# Patient Record
Sex: FEMALE | Race: WHITE | NOT HISPANIC OR LATINO
[De-identification: names, ages, dates, MRNs, and addresses within clinical notes are randomized per-mention and may not be internally consistent; named-entity substitution may affect disease eponyms.]

---

## 2017-04-05 ENCOUNTER — HOSPITAL (OUTPATIENT)
Dept: OTHER | Age: 27
End: 2017-04-05
Attending: EMERGENCY MEDICINE

## 2018-02-20 ENCOUNTER — HOSPITAL ENCOUNTER (OUTPATIENT)
Age: 28
Discharge: HOME | End: 2018-02-20
Attending: EMERGENCY MEDICINE
Payer: COMMERCIAL

## 2018-02-20 VITALS
SYSTOLIC BLOOD PRESSURE: 121 MMHG | HEIGHT: 64 IN | HEART RATE: 102 BPM | OXYGEN SATURATION: 99 % | TEMPERATURE: 99 F | RESPIRATION RATE: 18 BRPM | WEIGHT: 172 LBS | BODY MASS INDEX: 29.37 KG/M2 | DIASTOLIC BLOOD PRESSURE: 79 MMHG

## 2018-02-20 DIAGNOSIS — R10.13 DYSPEPSIA: Primary | ICD-10-CM

## 2018-02-20 PROCEDURE — 99203 OFFICE O/P NEW LOW 30 MIN: CPT

## 2018-02-20 PROCEDURE — 99202 OFFICE O/P NEW SF 15 MIN: CPT

## 2018-02-20 PROCEDURE — 99204 OFFICE O/P NEW MOD 45 MIN: CPT

## 2018-02-20 RX ORDER — PANTOPRAZOLE SODIUM 40 MG/1
40 TABLET, DELAYED RELEASE ORAL DAILY
Qty: 30 TABLET | Refills: 0 | Status: SHIPPED | OUTPATIENT
Start: 2018-02-20 | End: 2018-03-05

## 2018-02-20 NOTE — ED PROVIDER NOTES
Patient Seen in: [facility]    History   Patient presents with:  Abdominal Pain    Stated Complaint: Stomach Pain    HPI  Patient is here with her female partner.   The 6-8 weeks of epigastric discomfort described as a dul Review of Systems    Positive for stated complaint: Stomach Pain  Other systems are as noted in HPI. Constitutional and vital signs reviewed. All other systems reviewed and negative except as noted above.     Physical Exam   ED Triage Vitals [02 care physician that they see and she may follow-up with that doctor. Patient also was given the name of a  in this building. I will write a prescription for Protonix and see if acid reduction will help her symptoms.   She was advised that follow-up thi

## 2018-02-20 NOTE — ED INITIAL ASSESSMENT (HPI)
PATIENT ARRIVED AMBULATORY TO ROOM C/O INTERMITTENT ABDOMINAL PAIN X1 MONTH. PATIENT STATES \"I JUST WANTED IT CHECKED OUT\" +MID EPIGASTRIC ABDOMINAL PAIN RADIATING TO THE LEFT FLANK. +INTERMITTENT NAUSEA. NO V/D. NO VAGINAL BLEEDING/DISCHARGE.  NO DYSURIA

## 2018-03-05 ENCOUNTER — OFFICE VISIT (OUTPATIENT)
Dept: FAMILY MEDICINE CLINIC | Facility: CLINIC | Age: 28
End: 2018-03-05

## 2018-03-05 VITALS
HEART RATE: 93 BPM | SYSTOLIC BLOOD PRESSURE: 121 MMHG | BODY MASS INDEX: 30 KG/M2 | TEMPERATURE: 98 F | WEIGHT: 176 LBS | DIASTOLIC BLOOD PRESSURE: 78 MMHG

## 2018-03-05 DIAGNOSIS — K29.00 OTHER ACUTE GASTRITIS WITHOUT HEMORRHAGE: Primary | ICD-10-CM

## 2018-03-05 PROCEDURE — 99201 OFFICE/OUTPT VISIT,NEW,LEVL I: CPT | Performed by: FAMILY MEDICINE

## 2018-03-05 PROCEDURE — 99212 OFFICE O/P EST SF 10 MIN: CPT | Performed by: FAMILY MEDICINE

## 2018-03-05 RX ORDER — PANTOPRAZOLE SODIUM 40 MG/1
40 TABLET, DELAYED RELEASE ORAL
Qty: 90 TABLET | Refills: 3 | Status: SHIPPED | OUTPATIENT
Start: 2018-03-05 | End: 2018-12-23

## 2018-03-06 NOTE — PROGRESS NOTES
2 mo of burping and diarrhea stomach upset  Was taking Protonix form urgent care. Now much better minor stomach cramping. nausea  Drinks coffee cut down to 1  Was a heavy coffee drinker. Mom had gerd and acid reflux  Pos smoker.         Patient's past

## 2018-03-12 ENCOUNTER — LAB ENCOUNTER (OUTPATIENT)
Dept: LAB | Age: 28
End: 2018-03-12
Attending: FAMILY MEDICINE
Payer: COMMERCIAL

## 2018-03-12 DIAGNOSIS — K29.00 OTHER ACUTE GASTRITIS WITHOUT HEMORRHAGE: Primary | ICD-10-CM

## 2018-03-12 PROCEDURE — 83013 H PYLORI (C-13) BREATH: CPT

## 2018-03-13 LAB — H. PYLORI BREATH TEST: NEGATIVE

## 2018-03-21 ENCOUNTER — OFFICE VISIT (OUTPATIENT)
Dept: FAMILY MEDICINE CLINIC | Facility: CLINIC | Age: 28
End: 2018-03-21

## 2018-03-21 VITALS
BODY MASS INDEX: 29.19 KG/M2 | DIASTOLIC BLOOD PRESSURE: 86 MMHG | SYSTOLIC BLOOD PRESSURE: 125 MMHG | HEART RATE: 101 BPM | WEIGHT: 171 LBS | HEIGHT: 64 IN

## 2018-03-21 DIAGNOSIS — K21.9 GASTROESOPHAGEAL REFLUX DISEASE, ESOPHAGITIS PRESENCE NOT SPECIFIED: ICD-10-CM

## 2018-03-21 DIAGNOSIS — Z72.0 TOBACCO USE: ICD-10-CM

## 2018-03-21 DIAGNOSIS — K02.9 DENTAL CARIES: ICD-10-CM

## 2018-03-21 DIAGNOSIS — M54.50 ACUTE BILATERAL LOW BACK PAIN WITHOUT SCIATICA: ICD-10-CM

## 2018-03-21 DIAGNOSIS — Z00.00 WELL ADULT HEALTH CHECK: Primary | ICD-10-CM

## 2018-03-21 PROCEDURE — 99213 OFFICE O/P EST LOW 20 MIN: CPT | Performed by: NURSE PRACTITIONER

## 2018-03-21 PROCEDURE — 99395 PREV VISIT EST AGE 18-39: CPT | Performed by: NURSE PRACTITIONER

## 2018-03-21 RX ORDER — AMOXICILLIN 875 MG/1
875 TABLET, COATED ORAL 2 TIMES DAILY
Qty: 14 TABLET | Refills: 0 | Status: SHIPPED | OUTPATIENT
Start: 2018-03-21 | End: 2018-03-28

## 2018-03-21 NOTE — PROGRESS NOTES
HPI  Pt here for annual physical.  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 Has a left cracked wisdom tooth-has pain in left throat and jaw area  Smokes about a 1/2 pack per day.    Is sexually act Other Topics Concern   None on file     Social History Narrative   None on file         Current Outpatient Prescriptions:  amoxicillin 875 MG Oral Tab Take 1 tablet (875 mg total) by mouth 2 (two) times daily.  Disp: 14 tablet Rfl: 0   Pantoprazole Sodium ( Psychiatric: She has a normal mood and affect. Her behavior is normal. Judgment and thought content normal.   Nursing note and vitals reviewed. Assessment:     1. Well adult health check    2. Acute bilateral low back pain without sciatica    3.  Denta

## 2018-03-21 NOTE — PATIENT INSTRUCTIONS
LOW BACK PAIN    DESCRIPTION:     Eighty percent of adults will experience significant low back pain sometime during their lifetime. Low back pain usually involves muscle spasm of the supportive muscles along the spine.  Also, pain, numbness and  tingling TREATMENT:    REST: Rest from aggravating activity. Avoid prolonged sitting, driving, bending, heavy lifting and twisting. ICE: Ice applied to the low back for 20 minutes every 1 – 2 hours is helpful in reducing pain and spasm.  DO NOT use heat unless othe -please drink at least one large glass of water prior to coming in for labs to be drawn    [facility]  Address: [address] [facility], [address] [facility],  [address]  Hours:   Monday - Friday 7:30am - 6:30pm  Saturday 7:30am - 4pm    Phone: [phone]

## 2018-03-26 DIAGNOSIS — E53.8 VITAMIN B12 DEFICIENCY: ICD-10-CM

## 2018-03-26 DIAGNOSIS — E55.9 VITAMIN D DEFICIENCY: Primary | ICD-10-CM

## 2018-03-26 LAB
ABSOLUTE BASOPHILS: 91 CELLS/UL (ref 0–200)
ABSOLUTE EOSINOPHILS: 222 CELLS/UL (ref 15–500)
ABSOLUTE LYMPHOCYTES: 2242 CELLS/UL (ref 850–3900)
ABSOLUTE MONOCYTES: 535 CELLS/UL (ref 200–950)
ABSOLUTE NEUTROPHILS: 7009 CELLS/UL (ref 1500–7800)
ALBUMIN/GLOBULIN RATIO: 1.9 (CALC) (ref 1–2.5)
ALBUMIN: 4.9 G/DL (ref 3.6–5.1)
ALKALINE PHOSPHATASE: 41 U/L (ref 33–115)
ALT: 7 U/L (ref 6–29)
AST: 10 U/L (ref 10–30)
BASOPHILS: 0.9 %
BILIRUBIN, TOTAL: 0.6 MG/DL (ref 0.2–1.2)
BILIRUBIN: NEGATIVE
BUN: 9 MG/DL (ref 7–25)
CALCIUM: 9.6 MG/DL (ref 8.6–10.2)
CARBON DIOXIDE: 24 MMOL/L (ref 20–31)
CHLORIDE: 105 MMOL/L (ref 98–110)
CHOL/HDLC RATIO: 3.5 (CALC)
CHOLESTEROL, TOTAL: 150 MG/DL
COLOR: YELLOW
CREATININE: 0.84 MG/DL (ref 0.5–1.1)
EGFR IF AFRICN AM: 110 ML/MIN/1.73M2
EGFR IF NONAFRICN AM: 95 ML/MIN/1.73M2
EOSINOPHILS: 2.2 %
GLOBULIN: 2.6 G/DL (CALC) (ref 1.9–3.7)
GLUCOSE: 85 MG/DL (ref 65–99)
GLUCOSE: NEGATIVE
HDL CHOLESTEROL: 43 MG/DL
HEMATOCRIT: 42.1 % (ref 35–45)
HEMOGLOBIN A1C: 4.9 % OF TOTAL HGB
HEMOGLOBIN: 13.7 G/DL (ref 11.7–15.5)
LDL-CHOLESTEROL: 92 MG/DL (CALC)
LYMPHOCYTES: 22.2 %
MCH: 27.5 PG (ref 27–33)
MCHC: 32.5 G/DL (ref 32–36)
MCV: 84.5 FL (ref 80–100)
MONOCYTES: 5.3 %
MPV: 11.4 FL (ref 7.5–12.5)
NEUTROPHILS: 69.4 %
NITRITE: NEGATIVE
NON-HDL CHOLESTEROL: 107 MG/DL (CALC)
OCCULT BLOOD: NEGATIVE
PH: 6.5 (ref 5–8)
PLATELET COUNT: 320 THOUSAND/UL (ref 140–400)
POTASSIUM: 4.1 MMOL/L (ref 3.5–5.3)
PROTEIN, TOTAL: 7.5 G/DL (ref 6.1–8.1)
PROTEIN: NEGATIVE
RDW: 12.2 % (ref 11–15)
RED BLOOD CELL COUNT: 4.98 MILLION/UL (ref 3.8–5.1)
SODIUM: 138 MMOL/L (ref 135–146)
SPECIFIC GRAVITY: 1.02 (ref 1–1.03)
TRIGLYCERIDES: 66 MG/DL
TSH W/REFLEX TO FT4: 1.07 MIU/L
VITAMIN B12: 253 PG/ML (ref 200–1100)
VITAMIN D, 25-OH, TOTAL: 11 NG/ML (ref 30–100)
WHITE BLOOD CELL COUNT: 10.1 THOUSAND/UL (ref 3.8–10.8)

## 2018-03-26 RX ORDER — ERGOCALCIFEROL 1.25 MG/1
50000 CAPSULE ORAL
Qty: 12 CAPSULE | Refills: 4 | Status: SHIPPED | OUTPATIENT
Start: 2018-03-26 | End: 2018-06-24

## 2018-03-26 RX ORDER — NITROFURANTOIN 25; 75 MG/1; MG/1
100 CAPSULE ORAL 2 TIMES DAILY
Qty: 14 CAPSULE | Refills: 0 | Status: SHIPPED | OUTPATIENT
Start: 2018-03-26 | End: 2019-05-13 | Stop reason: ALTCHOICE

## 2018-12-24 RX ORDER — PANTOPRAZOLE SODIUM 40 MG/1
40 TABLET, DELAYED RELEASE ORAL
Qty: 90 TABLET | Refills: 0 | Status: SHIPPED | OUTPATIENT
Start: 2018-12-24 | End: 2019-03-24

## 2018-12-24 RX ORDER — PANTOPRAZOLE SODIUM 40 MG/1
40 TABLET, DELAYED RELEASE ORAL
Qty: 90 TABLET | Refills: 3 | OUTPATIENT
Start: 2018-12-24

## 2018-12-24 NOTE — TELEPHONE ENCOUNTER
Refill Protocol Appointment Criteria  · Appointment scheduled in the past 12 months or in the next 3 months  Recent Outpatient Visits            9 months ago Well adult health check    [address], [facility], Jersongeneva Brice, CHAPO, FNP-C

## 2019-03-25 RX ORDER — PANTOPRAZOLE SODIUM 40 MG/1
40 TABLET, DELAYED RELEASE ORAL
Qty: 90 TABLET | Refills: 0 | Status: CANCELLED | OUTPATIENT
Start: 2019-03-25

## 2019-03-27 RX ORDER — PANTOPRAZOLE SODIUM 40 MG/1
40 TABLET, DELAYED RELEASE ORAL
Qty: 30 TABLET | Refills: 0 | Status: SHIPPED | OUTPATIENT
Start: 2019-03-27 | End: 2019-03-27

## 2019-03-27 RX ORDER — PANTOPRAZOLE SODIUM 40 MG/1
TABLET, DELAYED RELEASE ORAL
Qty: 90 TABLET | Refills: 0 | Status: SHIPPED | OUTPATIENT
Start: 2019-03-27 | End: 2019-05-13

## 2019-05-13 ENCOUNTER — OFFICE VISIT (OUTPATIENT)
Dept: FAMILY MEDICINE CLINIC | Facility: CLINIC | Age: 29
End: 2019-05-13
Payer: COMMERCIAL

## 2019-05-13 VITALS
WEIGHT: 153 LBS | HEART RATE: 70 BPM | TEMPERATURE: 98 F | DIASTOLIC BLOOD PRESSURE: 68 MMHG | BODY MASS INDEX: 26 KG/M2 | SYSTOLIC BLOOD PRESSURE: 107 MMHG

## 2019-05-13 DIAGNOSIS — Z12.4 ROUTINE PAPANICOLAOU SMEAR: Primary | ICD-10-CM

## 2019-05-13 DIAGNOSIS — E55.9 VITAMIN D DEFICIENCY: ICD-10-CM

## 2019-05-13 DIAGNOSIS — Z72.0 TOBACCO ABUSE: ICD-10-CM

## 2019-05-13 DIAGNOSIS — Z00.00 ANNUAL PHYSICAL EXAM: ICD-10-CM

## 2019-05-13 DIAGNOSIS — R10.13 DYSPEPSIA: ICD-10-CM

## 2019-05-13 PROCEDURE — 90715 TDAP VACCINE 7 YRS/> IM: CPT | Performed by: FAMILY MEDICINE

## 2019-05-13 PROCEDURE — 90471 IMMUNIZATION ADMIN: CPT | Performed by: FAMILY MEDICINE

## 2019-05-13 PROCEDURE — 99395 PREV VISIT EST AGE 18-39: CPT | Performed by: FAMILY MEDICINE

## 2019-05-13 RX ORDER — PANTOPRAZOLE SODIUM 40 MG/1
40 TABLET, DELAYED RELEASE ORAL
Qty: 90 TABLET | Refills: 3 | Status: CANCELLED | OUTPATIENT
Start: 2019-05-13 | End: 2020-05-12

## 2019-05-13 RX ORDER — PANTOPRAZOLE SODIUM 20 MG/1
20 TABLET, DELAYED RELEASE ORAL
Qty: 90 TABLET | Refills: 0 | Status: SHIPPED | OUTPATIENT
Start: 2019-05-13 | End: 2019-06-13

## 2019-05-14 NOTE — PROGRESS NOTES
REASON FOR VISIT:    Enedelia Torres is a 26year old female who presents for an [address]. Doing well with ppi  No sob no brakethrough pain    Never had pap.     Patient Active Problem List:     Vitamin D deficiency     Vitamin B12 defic No disease specific diagnoses    ALLERGIES:   No Known Allergies  CURRENT MEDICATIONS:     Current Outpatient Medications:  Pantoprazole Sodium 20 MG Oral Tab EC Take 1 tablet (20 mg total) by mouth every morning before breakfast. Disp: 90 tablet Rfl: 0 are clear.  normal optic disc  NECK: supple, no adenopathy, no bruits  CHEST: no chest tenderness  BREAST: deferred   LUNGS: clear to auscultation  CARDIO: RRR without murmur  GI: good BS's, no masses, HSM or tenderness  :deferred  RECTAL: deferred  MUSCU

## 2019-06-13 ENCOUNTER — PATIENT MESSAGE (OUTPATIENT)
Dept: FAMILY MEDICINE CLINIC | Facility: CLINIC | Age: 29
End: 2019-06-13

## 2019-06-13 RX ORDER — PANTOPRAZOLE SODIUM 40 MG/1
40 TABLET, DELAYED RELEASE ORAL
Qty: 90 TABLET | Refills: 3 | Status: SHIPPED | OUTPATIENT
Start: 2019-06-13 | End: 2019-12-13

## 2019-06-13 NOTE — TELEPHONE ENCOUNTER
From: Kareem Espinoza  To: Aaliyah Jean DO  Sent: 6/13/2019 9:11 AM CDT  Subject: Prescription Question    Hello. During my last visit, you put me on a lower dose of pantoprazole. I don't feel like it's working as well as the dose I was on before.

## 2019-12-14 RX ORDER — PANTOPRAZOLE SODIUM 40 MG/1
40 TABLET, DELAYED RELEASE ORAL
Qty: 90 TABLET | Refills: 1 | Status: SHIPPED | OUTPATIENT
Start: 2019-12-14 | End: 2020-07-20

## 2019-12-14 NOTE — TELEPHONE ENCOUNTER
Refill passed per [facility] protocol.     Refill Protocol Appointment Criteria  · Appointment scheduled in the past 12 months or in the next 3 months  Recent Outpatient Visits            7 months ago Routine Papanicolaou smear    [facility]

## 2020-05-28 RX ORDER — PANTOPRAZOLE SODIUM 40 MG/1
40 TABLET, DELAYED RELEASE ORAL
Qty: 30 TABLET | Refills: 0 | Status: SHIPPED | OUTPATIENT
Start: 2020-05-28 | End: 2020-07-20

## 2020-05-28 NOTE — TELEPHONE ENCOUNTER
Jasbir Murray=medication pended,see patient's message . CSS=please call and schedule for establish care. MyChart message sent.

## 2020-05-30 RX ORDER — PANTOPRAZOLE SODIUM 40 MG/1
40 TABLET, DELAYED RELEASE ORAL
Qty: 30 TABLET | Refills: 0 | OUTPATIENT
Start: 2020-05-30

## 2020-06-02 RX ORDER — PANTOPRAZOLE SODIUM 40 MG/1
40 TABLET, DELAYED RELEASE ORAL
Qty: 30 TABLET | Refills: 0 | OUTPATIENT
Start: 2020-06-02

## 2020-07-20 ENCOUNTER — PATIENT MESSAGE (OUTPATIENT)
Dept: FAMILY MEDICINE CLINIC | Facility: CLINIC | Age: 30
End: 2020-07-20

## 2020-07-20 RX ORDER — PANTOPRAZOLE SODIUM 40 MG/1
40 TABLET, DELAYED RELEASE ORAL
Qty: 30 TABLET | Refills: 0 | Status: SHIPPED | OUTPATIENT
Start: 2020-07-20 | End: 2020-08-15

## 2020-07-20 NOTE — TELEPHONE ENCOUNTER
Please see patient's email and advise.     Requested Prescriptions     Pending Prescriptions Disp Refills   • Pantoprazole Sodium 40 MG Oral Tab EC 30 tablet 0     Sig: Take 1 tablet (40 mg total) by mouth every morning before breakfast.         Recent Visi

## 2020-07-20 NOTE — TELEPHONE ENCOUNTER
From: Ayush Conti  To: Dempsey Mohs, APRN  Sent: 7/20/2020 5:44 AM CDT  Subject: Prescription Question    Hello,    I was able to finally get in on August 15th for an appointment but she just ran out of my prescription this morning.  Is there a wa

## 2020-07-21 RX ORDER — PANTOPRAZOLE SODIUM 40 MG/1
40 TABLET, DELAYED RELEASE ORAL
Qty: 30 TABLET | Refills: 0 | OUTPATIENT
Start: 2020-07-21

## 2020-08-15 ENCOUNTER — OFFICE VISIT (OUTPATIENT)
Dept: FAMILY MEDICINE CLINIC | Facility: CLINIC | Age: 30
End: 2020-08-15
Payer: COMMERCIAL

## 2020-08-15 VITALS
DIASTOLIC BLOOD PRESSURE: 80 MMHG | HEIGHT: 64 IN | BODY MASS INDEX: 29.71 KG/M2 | HEART RATE: 84 BPM | SYSTOLIC BLOOD PRESSURE: 125 MMHG | WEIGHT: 174 LBS

## 2020-08-15 DIAGNOSIS — E53.8 VITAMIN B12 DEFICIENCY: ICD-10-CM

## 2020-08-15 DIAGNOSIS — Z00.00 WELL ADULT EXAM: Primary | ICD-10-CM

## 2020-08-15 DIAGNOSIS — K21.9 GASTROESOPHAGEAL REFLUX DISEASE WITHOUT ESOPHAGITIS: ICD-10-CM

## 2020-08-15 DIAGNOSIS — E55.9 VITAMIN D DEFICIENCY: ICD-10-CM

## 2020-08-15 PROCEDURE — 99395 PREV VISIT EST AGE 18-39: CPT | Performed by: NURSE PRACTITIONER

## 2020-08-15 PROCEDURE — 3074F SYST BP LT 130 MM HG: CPT | Performed by: NURSE PRACTITIONER

## 2020-08-15 PROCEDURE — 3079F DIAST BP 80-89 MM HG: CPT | Performed by: NURSE PRACTITIONER

## 2020-08-15 PROCEDURE — 3008F BODY MASS INDEX DOCD: CPT | Performed by: NURSE PRACTITIONER

## 2020-08-15 RX ORDER — PANTOPRAZOLE SODIUM 40 MG/1
40 TABLET, DELAYED RELEASE ORAL
Qty: 90 TABLET | Refills: 3 | Status: SHIPPED | OUTPATIENT
Start: 2020-08-15 | End: 2021-05-05

## 2020-08-15 RX ORDER — PANTOPRAZOLE SODIUM 40 MG/1
40 TABLET, DELAYED RELEASE ORAL
Qty: 90 TABLET | Refills: 3 | Status: SHIPPED | OUTPATIENT
Start: 2020-08-15 | End: 2020-08-15

## 2020-08-15 NOTE — PATIENT INSTRUCTIONS
Prevention Guidelines, Women Ages 25 to 44  Screening tests and vaccines are an important part of managing your health. A screening test is done to find possible disorders or diseases in people who don't have any symptoms.  The goal is to find a disease e Anyone at increased risk  At routine exams   HIV All women At routine exams3     Obesity All women in this age group  At routine exams   Syphilis Women at increased risk for infection should talk with their healthcare provider  At routine exams   Tuberculo (protects against 13 types of pneumococcal bacteria)   PPSV23: 1 to 2 doses through age 59, or 1 dose at 72 or older (protects against 23 types of pneumococcal bacteria)    Tetanus/diphtheria/pertussis (Td/Tdap) booster All women in this age group  Td ever All rights reserved. This information is not intended as a substitute for professional medical care. Always follow your healthcare professional's instructions.         Lifestyle Changes for Controlling GERD  When you have GERD, stomach acid feels as if it’s [address]. [address] [facility], [address] [place] [address]. All rights reserved. This information is not intended as a substitute for professional medical care. Always follow your healthcare professional's instructions.

## 2020-08-15 NOTE — PROGRESS NOTES
HPI  Pt here for annual physical.   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 Quit smoking one year ago. Has been going to gym every day. Worked out at home when shut down was in progress.     Periods-regular  Last pap-5 months ago-wnl will f/u Maternal Grandfather    • Heart Attack Maternal Grandfather    • Dementia Paternal Grandmother    • Dementia Paternal Grandfather        Social History    Socioeconomic History      Marital status: Single      Spouse name: Not on file      Number of childr Constitutional: She is oriented to person, place, and time. She appears well-developed and well-nourished. No distress. HENT:   Head: Normocephalic and atraumatic.    Right Ear: Tympanic membrane and ear canal normal. No cerumen present  Left Ear: Tympa fall  Congratulated on quitting smoking.           Relevant Orders    CBC, PLATELET; NO DIFFERENTIAL    COMP METABOLIC PANEL (14)    HEMOGLOBIN A1C    LIPID PANEL    TSH W REFLEX TO FREE T4    VITAMIN B12    VITAMIN D, 25-HYDROXY                      Discus

## 2020-10-13 ENCOUNTER — NURSE TRIAGE (OUTPATIENT)
Dept: FAMILY MEDICINE CLINIC | Facility: CLINIC | Age: 30
End: 2020-10-13

## 2020-10-13 ENCOUNTER — PATIENT MESSAGE (OUTPATIENT)
Dept: FAMILY MEDICINE CLINIC | Facility: CLINIC | Age: 30
End: 2020-10-13

## 2020-10-13 NOTE — TELEPHONE ENCOUNTER
Rupali sent.   RN=call and triage    ----- Message from Ciro Kramer sent at 10/13/2020  5:57 AM CDT -----  Regarding: Prescription Question  Contact: [phone]  Good morning,   I'm almost positive I have a UTI, it's been hurting the last two days w

## 2020-10-13 NOTE — TELEPHONE ENCOUNTER
Patient has read [employer] message  [address]    [employer] User Last Read On   Kinza Redramona 10/13/2020  2:12 PM

## 2020-10-14 NOTE — TELEPHONE ENCOUNTER
Pt should seek medical care from Immediate Care near her if unable to come in. We really need to check urine to see if infection is present and not just prescribe antibiotics. Is she able to go to a [facility] facility near her to drop off a urine speciman?  I

## 2020-10-14 NOTE — TELEPHONE ENCOUNTER
Action Requested: Summary for Provider     []  Critical Lab, Recommendations Needed  [] Need Additional Advice  []   FYI    []   Need Orders  [x] Need Medications Sent to Pharmacy  []  Other     SUMMARY: Per protocol advised :   OV  Declines OV     Reason

## 2020-10-14 NOTE — TELEPHONE ENCOUNTER
The patient was informed and will proceed to a [facility]. I suggested she call her insurance company to see which locations are covered.   She agreed

## 2021-05-05 DIAGNOSIS — K21.9 GASTROESOPHAGEAL REFLUX DISEASE WITHOUT ESOPHAGITIS: ICD-10-CM

## 2021-05-05 RX ORDER — PANTOPRAZOLE SODIUM 40 MG/1
40 TABLET, DELAYED RELEASE ORAL
Qty: 90 TABLET | Refills: 0 | Status: SHIPPED | OUTPATIENT
Start: 2021-05-05 | End: 2021-08-18

## 2021-07-19 ENCOUNTER — TELEPHONE (OUTPATIENT)
Dept: FAMILY MEDICINE CLINIC | Facility: CLINIC | Age: 31
End: 2021-07-19

## 2021-07-19 NOTE — TELEPHONE ENCOUNTER
Please advise.    ----- Message -----   From: Ayush Conti   Sent: 7/19/2021  10:32 AM CDT   To: Jaylan Ramirez Customer Response Pool   Subject: Appointment on August 7th                          Topic: Bill or Statement      Hello    I have a physical sched

## 2021-08-18 DIAGNOSIS — K21.9 GASTROESOPHAGEAL REFLUX DISEASE WITHOUT ESOPHAGITIS: ICD-10-CM

## 2021-08-18 RX ORDER — PANTOPRAZOLE SODIUM 40 MG/1
40 TABLET, DELAYED RELEASE ORAL
Qty: 90 TABLET | Refills: 0 | Status: SHIPPED | OUTPATIENT
Start: 2021-08-18 | End: 2022-01-05

## 2021-08-31 ENCOUNTER — MED REC SCAN ONLY (OUTPATIENT)
Dept: FAMILY MEDICINE CLINIC | Facility: CLINIC | Age: 31
End: 2021-08-31

## 2021-09-04 ENCOUNTER — OFFICE VISIT (OUTPATIENT)
Dept: FAMILY MEDICINE CLINIC | Facility: CLINIC | Age: 31
End: 2021-09-04
Payer: COMMERCIAL

## 2021-09-04 VITALS
BODY MASS INDEX: 29.53 KG/M2 | WEIGHT: 173 LBS | SYSTOLIC BLOOD PRESSURE: 127 MMHG | DIASTOLIC BLOOD PRESSURE: 85 MMHG | HEIGHT: 64 IN | HEART RATE: 86 BPM

## 2021-09-04 DIAGNOSIS — Z00.00 WELL ADULT EXAM: Primary | ICD-10-CM

## 2021-09-04 DIAGNOSIS — E53.8 VITAMIN B12 DEFICIENCY: ICD-10-CM

## 2021-09-04 DIAGNOSIS — E55.9 VITAMIN D DEFICIENCY: ICD-10-CM

## 2021-09-04 PROCEDURE — 3008F BODY MASS INDEX DOCD: CPT | Performed by: NURSE PRACTITIONER

## 2021-09-04 PROCEDURE — 99395 PREV VISIT EST AGE 18-39: CPT | Performed by: NURSE PRACTITIONER

## 2021-09-04 PROCEDURE — 3079F DIAST BP 80-89 MM HG: CPT | Performed by: NURSE PRACTITIONER

## 2021-09-04 PROCEDURE — 3074F SYST BP LT 130 MM HG: CPT | Performed by: NURSE PRACTITIONER

## 2021-09-04 NOTE — PROGRESS NOTES
HPI  Pt here for annual physical.     No acute or chronic health concerns. Quit smoking 2 years ago. Decided to change positions at work for a less stressful job and is so happy that she did. States that she has never felt so healthy as she does now. Single      Spouse name: Not on file      Number of children: Not on file      Years of education: Not on file      Highest education level: Not on file    Occupational History      Not on file    Tobacco Use      Smoking status: Former Smoker        Types Left Ear: Tympanic membrane and ear canal normal.      Nose: Nose normal.      Mouth/Throat:      Mouth: Mucous membranes are moist.      Pharynx: Oropharynx is clear. Eyes:      General:         Right eye: No discharge. Left eye: No discharge. DIFFERENTIAL    COMP METABOLIC PANEL (14)    HEMOGLOBIN A1C    LIPID PANEL    TSH W REFLEX TO FREE T4    VITAMIN B12    VITAMIN D, 25-HYDROXY                      Discussed plan of care with pt and pt is in agreement. All questions answered.  Pt to call with

## 2021-12-15 ENCOUNTER — TELEPHONE (OUTPATIENT)
Dept: FAMILY MEDICINE CLINIC | Facility: CLINIC | Age: 31
End: 2021-12-15

## 2021-12-15 ENCOUNTER — PATIENT MESSAGE (OUTPATIENT)
Dept: FAMILY MEDICINE CLINIC | Facility: CLINIC | Age: 31
End: 2021-12-15

## 2021-12-15 NOTE — TELEPHONE ENCOUNTER
From: Benjy Nixon  To: VINNIE Peñaloza  Sent: 12/15/2021 12:33 PM CST  Subject: Covid     Hi! I tested positive for covid exactly two weeks ago today. I am vaccinated but not with a booster.  I’m now covid negative after several test and I am f

## 2021-12-15 NOTE — TELEPHONE ENCOUNTER
----- Message from Emily Brown sent at 12/15/2021 12:33 PM CST -----  Regarding: Covid   Hi! I tested positive for covid exactly two weeks ago today. I am vaccinated but not with a booster.  I’m now covid negative after several test and I am feeling mu

## 2022-01-05 DIAGNOSIS — K21.9 GASTROESOPHAGEAL REFLUX DISEASE WITHOUT ESOPHAGITIS: ICD-10-CM

## 2022-01-05 RX ORDER — PANTOPRAZOLE SODIUM 40 MG/1
40 TABLET, DELAYED RELEASE ORAL
Qty: 90 TABLET | Refills: 0 | Status: SHIPPED | OUTPATIENT
Start: 2022-01-05

## 2022-01-05 NOTE — TELEPHONE ENCOUNTER
Rx request for Pantoprazole 40 mg PASSED protocol. Rx filled per protocol.     Refill Protocol Appointment Criteria  · Appointment scheduled in the past 12 months or in the next 3 months  Recent Outpatient Visits            4 months ago Well adult exam    E

## 2022-01-12 ENCOUNTER — MED REC SCAN ONLY (OUTPATIENT)
Dept: FAMILY MEDICINE CLINIC | Facility: CLINIC | Age: 32
End: 2022-01-12

## 2022-04-12 DIAGNOSIS — K21.9 GASTROESOPHAGEAL REFLUX DISEASE WITHOUT ESOPHAGITIS: ICD-10-CM

## 2022-04-13 RX ORDER — PANTOPRAZOLE SODIUM 40 MG/1
40 TABLET, DELAYED RELEASE ORAL
Qty: 90 TABLET | Refills: 1 | Status: SHIPPED | OUTPATIENT
Start: 2022-04-13 | End: 2023-02-18

## 2022-04-13 NOTE — TELEPHONE ENCOUNTER
Refill passed per [facility] protocol.      Requested Prescriptions   Pending Prescriptions Disp Refills    pantoprazole 40 MG Oral Tab EC 90 tablet 0     Sig: Take 1 tablet (40 mg total) by mouth every morning before breakfast.        Gastrointestional Medication Protocol Passed - 4/12/2022 10:19 PM        Passed - Appointment in past 12 or next 3 months                Recent Outpatient Visits              7 months ago Well adult exam    [facility] [address], [place] Simental, APRN    Office Visit    1 year ago Well adult exam    [facility] [address], [address], Nestor Patch, APRN    Office Visit    2 years ago Routine Papanicolaou smear    [facility], DO    Office Visit    4 years ago Well adult health check    [facility] [address], [address], Nestor Patch, APRN    Office Visit    4 years ago Other acute gastritis without hemorrhage    [address], DO    Office Visit

## 2022-05-25 ENCOUNTER — MED REC SCAN ONLY (OUTPATIENT)
Dept: FAMILY MEDICINE CLINIC | Facility: CLINIC | Age: 32
End: 2022-05-25

## 2023-01-04 ENCOUNTER — TELEMEDICINE (OUTPATIENT)
Dept: TELEHEALTH | Age: 33
End: 2023-01-04

## 2023-01-04 DIAGNOSIS — R10.10 PAIN OF UPPER ABDOMEN: Primary | ICD-10-CM

## 2023-01-04 NOTE — PATIENT INSTRUCTIONS
Fluids   Trial of zantac OTC to see if improvement   [place] diet- avoid GERD triggering foods   Follow up with GI as scheduled.    ED for worsening pain, fever, vomiting, inability to pass stool

## 2023-01-19 ENCOUNTER — PATIENT MESSAGE (OUTPATIENT)
Dept: FAMILY MEDICINE CLINIC | Facility: CLINIC | Age: 33
End: 2023-01-19

## 2023-01-19 DIAGNOSIS — Z00.00 WELL ADULT EXAM: Primary | ICD-10-CM

## 2023-01-19 NOTE — TELEPHONE ENCOUNTER
Nori Lamas RN 1/19/2023 11:35 AM CST        ----- Message -----  From: Bayron Linder  Sent: 1/19/2023 11:01 AM CST  To: Em Rn Triage  Subject: Blood Work     Hi there! I have a physical scheduled with you on 2/18. Is it possible to get me an order for blood work a week prior to that appointment?     Thank you

## 2023-01-21 ENCOUNTER — PATIENT MESSAGE (OUTPATIENT)
Dept: FAMILY MEDICINE CLINIC | Facility: CLINIC | Age: 33
End: 2023-01-21

## 2023-01-22 ENCOUNTER — PATIENT MESSAGE (OUTPATIENT)
Dept: FAMILY MEDICINE CLINIC | Facility: CLINIC | Age: 33
End: 2023-01-22

## 2023-01-22 NOTE — TELEPHONE ENCOUNTER
From: Damien Section  To: VINNIE Bray  Sent: 1/19/2023 11:01 AM CST  Subject: Blood Work    Hi there! I have a physical scheduled with you on 2/18. Is it possible to get me an order for blood work a week prior to that appointment?     Thank you

## 2023-02-01 ENCOUNTER — PATIENT MESSAGE (OUTPATIENT)
Dept: FAMILY MEDICINE CLINIC | Facility: CLINIC | Age: 33
End: 2023-02-01

## 2023-02-01 NOTE — TELEPHONE ENCOUNTER
From: VINNIE Khan  To: Janene Boggs  Sent: 1/22/2023 10:28 AM CST  Subject: labs    Your labs have been ordered and sent to [facility] -you will need to call your local [facility] and see if an appointment is required or walk ins are allowed. You will need to be fasting from 10pm the night before-it is ok to drink water during that time. Please let me know if you have any questions or concerns.    CHAPO Khan, FNP-C

## 2023-02-11 NOTE — TELEPHONE ENCOUNTER
From: VINNIE Bloom  To: Hawa Torres  Sent: 2/1/2023 1:57 PM CST  Subject: labs    Your wisdom teeth surgery should not affect your labs unless there is an infection preset-but I have it documented so go ahead and have your labs done at your convenience.    CHAPO Bloom, MINDIP-C

## 2023-02-16 LAB
ALBUMIN/GLOBULIN RATIO: 1.6 (CALC) (ref 1–2.5)
ALBUMIN: 4.5 G/DL (ref 3.6–5.1)
ALKALINE PHOSPHATASE: 36 U/L (ref 31–125)
ALT: 13 U/L (ref 6–29)
AST: 14 U/L (ref 10–30)
BILIRUBIN, TOTAL: 0.5 MG/DL (ref 0.2–1.2)
BUN: 13 MG/DL (ref 7–25)
CALCIUM: 9.3 MG/DL (ref 8.6–10.2)
CARBON DIOXIDE: 23 MMOL/L (ref 20–32)
CHLORIDE: 105 MMOL/L (ref 98–110)
CHOL/HDLC RATIO: 3.5 (CALC)
CHOLESTEROL, TOTAL: 173 MG/DL
CREATININE: 0.74 MG/DL (ref 0.5–0.97)
EGFR: 109 ML/MIN/1.73M2
GLOBULIN: 2.8 G/DL (CALC) (ref 1.9–3.7)
GLUCOSE: 99 MG/DL (ref 65–99)
HDL CHOLESTEROL: 50 MG/DL
HEMATOCRIT: 40.9 % (ref 35–45)
HEMOGLOBIN A1C: 5.2 % OF TOTAL HGB
HEMOGLOBIN: 13.4 G/DL (ref 11.7–15.5)
LDL-CHOLESTEROL: 106 MG/DL (CALC)
MCH: 27.8 PG (ref 27–33)
MCHC: 32.8 G/DL (ref 32–36)
MCV: 84.9 FL (ref 80–100)
MPV: 10.7 FL (ref 7.5–12.5)
NON-HDL CHOLESTEROL: 123 MG/DL (CALC)
PLATELET COUNT: 329 THOUSAND/UL (ref 140–400)
POTASSIUM: 4.2 MMOL/L (ref 3.5–5.3)
PROTEIN, TOTAL: 7.3 G/DL (ref 6.1–8.1)
RDW: 12.1 % (ref 11–15)
RED BLOOD CELL COUNT: 4.82 MILLION/UL (ref 3.8–5.1)
SODIUM: 138 MMOL/L (ref 135–146)
TRIGLYCERIDES: 80 MG/DL
TSH W/REFLEX TO FT4: 1.97 MIU/L
VITAMIN B12: 260 PG/ML (ref 200–1100)
VITAMIN D, 25-OH, TOTAL: 21 NG/ML (ref 30–100)
WHITE BLOOD CELL COUNT: 9.9 THOUSAND/UL (ref 3.8–10.8)

## 2023-02-17 DIAGNOSIS — E55.9 VITAMIN D DEFICIENCY: Primary | ICD-10-CM

## 2023-02-17 RX ORDER — CHOLECALCIFEROL (VITAMIN D3) 1250 MCG
50000 CAPSULE ORAL WEEKLY
Qty: 12 CAPSULE | Refills: 3 | Status: SHIPPED | OUTPATIENT
Start: 2023-02-17 | End: 2023-02-18

## 2023-02-18 ENCOUNTER — OFFICE VISIT (OUTPATIENT)
Dept: FAMILY MEDICINE CLINIC | Facility: CLINIC | Age: 33
End: 2023-02-18

## 2023-02-18 VITALS
HEIGHT: 64 IN | BODY MASS INDEX: 30.9 KG/M2 | WEIGHT: 181 LBS | HEART RATE: 106 BPM | DIASTOLIC BLOOD PRESSURE: 82 MMHG | SYSTOLIC BLOOD PRESSURE: 117 MMHG

## 2023-02-18 DIAGNOSIS — E55.9 VITAMIN D DEFICIENCY: ICD-10-CM

## 2023-02-18 DIAGNOSIS — K21.9 GASTROESOPHAGEAL REFLUX DISEASE WITHOUT ESOPHAGITIS: ICD-10-CM

## 2023-02-18 RX ORDER — PANTOPRAZOLE SODIUM 40 MG/1
40 TABLET, DELAYED RELEASE ORAL
Qty: 90 TABLET | Refills: 1 | Status: SHIPPED | OUTPATIENT
Start: 2023-02-18

## 2023-02-18 RX ORDER — CHOLECALCIFEROL (VITAMIN D3) 1250 MCG
50000 CAPSULE ORAL WEEKLY
Qty: 12 CAPSULE | Refills: 3 | Status: SHIPPED | OUTPATIENT
Start: 2023-02-18 | End: 2023-03-20

## 2023-02-18 NOTE — ASSESSMENT & PLAN NOTE
Restart protonix  Discussed diet, position after eating, raising hob  Declines referral to GI at this time. Please call if symptoms worsen or are not resolving.

## 2023-05-02 ENCOUNTER — PATIENT MESSAGE (OUTPATIENT)
Dept: FAMILY MEDICINE CLINIC | Facility: CLINIC | Age: 33
End: 2023-05-02

## 2023-05-04 ENCOUNTER — PATIENT MESSAGE (OUTPATIENT)
Dept: FAMILY MEDICINE CLINIC | Facility: CLINIC | Age: 33
End: 2023-05-04

## 2023-05-04 DIAGNOSIS — Z11.3 ENCOUNTER FOR SCREENING FOR INFECTIONS WITH A PREDOMINANTLY SEXUAL MODE OF TRANSMISSION: Primary | ICD-10-CM

## 2023-05-04 NOTE — TELEPHONE ENCOUNTER
From: Anival Espinoza  To: VINNIE Funes  Sent: 5/2/2023 10:00 PM CDT  Subject: Blood Work    Hello,  My wife will be going through ivf treatment in the next couple of months and one of the things the clinic is requesting is infectious disease panel blood work from me.  Is that something you are able to order through [facility] or do I have to do that on my own??

## 2023-06-13 LAB
CHLAMYDIA TRACHOMATIS$RNA, TMA: NOT DETECTED
NEISSERIA GONORRHOEAE$RNA, TMA: NOT DETECTED
SIGNAL TO CUT-OFF: 0.16

## 2023-08-06 DIAGNOSIS — K21.9 GASTROESOPHAGEAL REFLUX DISEASE WITHOUT ESOPHAGITIS: ICD-10-CM

## 2023-08-07 RX ORDER — PANTOPRAZOLE SODIUM 40 MG/1
40 TABLET, DELAYED RELEASE ORAL
Qty: 90 TABLET | Refills: 3 | Status: SHIPPED | OUTPATIENT
Start: 2023-08-07

## 2023-08-08 NOTE — TELEPHONE ENCOUNTER
Refill passed per [facility] protocol.      Requested Prescriptions   Pending Prescriptions Disp Refills    PANTOPRAZOLE 40 MG Oral Tab EC [Pharmacy Med Name: Pantoprazole Sodium 40 MG Oral Tablet Delayed Release] 90 tablet 0     Sig: TAKE 1 TABLET BY MOUTH IN THE MORNING BEFORE BREAKFAST       Gastrointestional Medication Protocol Passed - 8/6/2023 11:19 AM        Passed - In person appointment or virtual visit in the past 12 mos or appointment in next 3 mos     Recent Outpatient Visits              5 months ago Vitamin D deficiency    [address], [place] Doneen Cassette, APRN    Office Visit    7 months ago Pain of upper abdomen    [facility], Virtual Visit Tre Courtney PA-C    Telemedicine    1 year ago Well adult exam    [address], [place] Doneen Cassette, APRN    Office Visit    2 years ago Well adult exam    [address], [place] Doneen Cassette, APRN    Office Visit    4 years ago Routine Papanicolaou smear    [address], [address] [facility], [address] [place] [address] Visit                                 Recent Outpatient Visits              5 months ago Vitamin D deficiency    [address], [place] Doneen Cassette, APRN    Office Visit    7 months ago Pain of upper abdomen    [facility], Virtual Visit Jessica Moore, Mary Lou Liz PA-C    Telemedicine    1 year ago Well adult exam    [address], [place] Doneen Cassette, APRN    Office Visit    2 years ago Well adult exam    [address], [address], [place] Doneen Cassette, APRN    Office Visit    4 years ago Routine Papanicolaou smear    [address], [address] [facility], [address] [place] [address], [facility] Pack, DO    Office Visit

## 2024-10-10 DIAGNOSIS — K21.9 GASTROESOPHAGEAL REFLUX DISEASE WITHOUT ESOPHAGITIS: ICD-10-CM

## 2024-10-11 RX ORDER — PANTOPRAZOLE SODIUM 40 MG/1
40 TABLET, DELAYED RELEASE ORAL
Qty: 90 TABLET | Refills: 0 | Status: SHIPPED | OUTPATIENT
Start: 2024-10-11

## 2024-10-11 NOTE — TELEPHONE ENCOUNTER
Please Review. Protocol Failed; No Protocol     Requested Prescriptions   Pending Prescriptions Disp Refills    pantoprazole 40 MG Oral Tab EC 90 tablet 3     Sig: Take 1 tablet (40 mg total) by mouth before breakfast.       Gastrointestional Medication Protocol Failed - 10/11/2024  2:41 PM        Failed - In person appointment or virtual visit in the past 12 mos or appointment in next 3 mos     Recent Outpatient Visits              1 year ago Vitamin D deficiency    [facility], [place]ra [place]berg, APRN    Office Visit    1 year ago Pain of upper abdomen    [facility], Virtual Visit Kassie Blandon PA-C    Telemedicine    3 years ago Well adult exam    [facility], [place], APRN    Office Visit    4 years ago Well adult exam    [facility], [place], APRN    Office Visit    5 years ago Routine Papanicolaou smear    [facility], [place], Carlos M, DO    Office Visit                               Recent Outpatient Visits              1 year ago Vitamin D deficiency    [facility], [place], APRN    Office Visit    1 year ago Pain of upper abdomen    [facility], Virtual Visit Kassie Blandon PA-C    Telemedicine    3 years ago Well adult exam    [facility], Zahra Bravo, APRN    Office Visit    4 years ago [facility] adult exam    [facility]    [facility] Visit    5 years ago Routine Papanicolaou smear    [facility], [place], Carlos M,     Office Visit

## 2024-11-26 NOTE — TELEPHONE ENCOUNTER
From: VINNIE Aguilar  To: Emily Brown  Sent: 5/4/2023 8:08 AM CDT  Subject: blood work    Leann-please let me know what labs need to be drawn-there are a variety or what infectious diseases that we test for. I just want to make certain that you are screened for the correct diseases as requested by the fertility center.    CHAPO Aguilar, MINDIP-C
Lab orders placed
I have personally seen and examined the patient. I have collaborated with and supervised the

## (undated) NOTE — LETTER
10/04/21        [address]      Dear Florin Mcmanus records indicate that you have outstanding lab work and or testing that was ordered for you and has not yet been completed:  Orders Placed This Encounte

## (undated) NOTE — LETTER
06/12/19        [facility]      Dear [facility] records indicate that you have outstanding lab work and or testing that was ordered for you and has not yet been completed:  Orders Placed This Encounter